# Patient Record
Sex: FEMALE | Race: WHITE | Employment: OTHER
[De-identification: names, ages, dates, MRNs, and addresses within clinical notes are randomized per-mention and may not be internally consistent; named-entity substitution may affect disease eponyms.]

---

## 2017-05-26 ENCOUNTER — HEALTH MAINTENANCE LETTER (OUTPATIENT)
Age: 32
End: 2017-05-26

## 2019-09-05 ENCOUNTER — APPOINTMENT (OUTPATIENT)
Dept: CT IMAGING | Facility: CLINIC | Age: 34
End: 2019-09-05
Attending: EMERGENCY MEDICINE

## 2019-09-05 ENCOUNTER — HOSPITAL ENCOUNTER (EMERGENCY)
Facility: CLINIC | Age: 34
Discharge: HOME | End: 2019-09-05
Attending: EMERGENCY MEDICINE | Admitting: EMERGENCY MEDICINE

## 2019-09-05 VITALS
BODY MASS INDEX: 22.88 KG/M2 | TEMPERATURE: 98.3 F | SYSTOLIC BLOOD PRESSURE: 107 MMHG | HEART RATE: 72 BPM | HEIGHT: 64 IN | DIASTOLIC BLOOD PRESSURE: 61 MMHG | WEIGHT: 134 LBS | RESPIRATION RATE: 18 BRPM | OXYGEN SATURATION: 98 %

## 2019-09-05 DIAGNOSIS — R07.9 CHEST PAIN, UNSPECIFIED TYPE: ICD-10-CM

## 2019-09-05 LAB
ALBUMIN SERPL-MCNC: 4.6 G/DL (ref 3.4–5)
ALP SERPL-CCNC: 47 U/L (ref 40–150)
ALT SERPL W P-5'-P-CCNC: 19 U/L (ref 0–50)
ANION GAP SERPL CALCULATED.3IONS-SCNC: 5 MMOL/L (ref 3–14)
AST SERPL W P-5'-P-CCNC: 14 U/L (ref 0–45)
BASOPHILS # BLD AUTO: 0 10E9/L (ref 0–0.2)
BASOPHILS NFR BLD AUTO: 0.2 %
BILIRUB SERPL-MCNC: 0.5 MG/DL (ref 0.2–1.3)
BUN SERPL-MCNC: 14 MG/DL (ref 7–30)
CALCIUM SERPL-MCNC: 9.4 MG/DL (ref 8.5–10.1)
CHLORIDE SERPL-SCNC: 105 MMOL/L (ref 94–109)
CO2 SERPL-SCNC: 28 MMOL/L (ref 20–32)
CREAT SERPL-MCNC: 0.79 MG/DL (ref 0.52–1.04)
D DIMER PPP FEU-MCNC: 0.7 UG/ML FEU (ref 0–0.5)
DIFFERENTIAL METHOD BLD: NORMAL
EOSINOPHIL # BLD AUTO: 0.2 10E9/L (ref 0–0.7)
EOSINOPHIL NFR BLD AUTO: 2.2 %
ERYTHROCYTE [DISTWIDTH] IN BLOOD BY AUTOMATED COUNT: 12.5 % (ref 10–15)
GFR SERPL CREATININE-BSD FRML MDRD: >90 ML/MIN/{1.73_M2}
GLUCOSE SERPL-MCNC: 91 MG/DL (ref 70–99)
HCT VFR BLD AUTO: 38.2 % (ref 35–47)
HGB BLD-MCNC: 13 G/DL (ref 11.7–15.7)
IMM GRANULOCYTES # BLD: 0 10E9/L (ref 0–0.4)
IMM GRANULOCYTES NFR BLD: 0.1 %
INTERPRETATION ECG - MUSE: NORMAL
LIPASE SERPL-CCNC: 99 U/L (ref 73–393)
LYMPHOCYTES # BLD AUTO: 2.3 10E9/L (ref 0.8–5.3)
LYMPHOCYTES NFR BLD AUTO: 27.1 %
MCH RBC QN AUTO: 32 PG (ref 26.5–33)
MCHC RBC AUTO-ENTMCNC: 34 G/DL (ref 31.5–36.5)
MCV RBC AUTO: 94 FL (ref 78–100)
MONOCYTES # BLD AUTO: 0.5 10E9/L (ref 0–1.3)
MONOCYTES NFR BLD AUTO: 5.3 %
NEUTROPHILS # BLD AUTO: 5.5 10E9/L (ref 1.6–8.3)
NEUTROPHILS NFR BLD AUTO: 65.1 %
NRBC # BLD AUTO: 0 10*3/UL
NRBC BLD AUTO-RTO: 0 /100
PLATELET # BLD AUTO: 299 10E9/L (ref 150–450)
POTASSIUM SERPL-SCNC: 3.4 MMOL/L (ref 3.4–5.3)
PROT SERPL-MCNC: 8.2 G/DL (ref 6.8–8.8)
RBC # BLD AUTO: 4.06 10E12/L (ref 3.8–5.2)
SODIUM SERPL-SCNC: 138 MMOL/L (ref 133–144)
TROPONIN I SERPL-MCNC: <0.015 UG/L (ref 0–0.04)
TROPONIN I SERPL-MCNC: <0.015 UG/L (ref 0–0.04)
WBC # BLD AUTO: 8.5 10E9/L (ref 4–11)

## 2019-09-05 PROCEDURE — 85379 FIBRIN DEGRADATION QUANT: CPT | Performed by: EMERGENCY MEDICINE

## 2019-09-05 PROCEDURE — 99285 EMERGENCY DEPT VISIT HI MDM: CPT | Mod: 25

## 2019-09-05 PROCEDURE — 93005 ELECTROCARDIOGRAM TRACING: CPT

## 2019-09-05 PROCEDURE — 85025 COMPLETE CBC W/AUTO DIFF WBC: CPT | Performed by: EMERGENCY MEDICINE

## 2019-09-05 PROCEDURE — 83690 ASSAY OF LIPASE: CPT | Performed by: EMERGENCY MEDICINE

## 2019-09-05 PROCEDURE — 71275 CT ANGIOGRAPHY CHEST: CPT

## 2019-09-05 PROCEDURE — 84484 ASSAY OF TROPONIN QUANT: CPT | Performed by: EMERGENCY MEDICINE

## 2019-09-05 PROCEDURE — 80053 COMPREHEN METABOLIC PANEL: CPT | Performed by: EMERGENCY MEDICINE

## 2019-09-05 PROCEDURE — 25000125 ZZHC RX 250: Performed by: EMERGENCY MEDICINE

## 2019-09-05 PROCEDURE — 25000128 H RX IP 250 OP 636: Performed by: EMERGENCY MEDICINE

## 2019-09-05 PROCEDURE — 93005 ELECTROCARDIOGRAM TRACING: CPT | Mod: 76

## 2019-09-05 RX ORDER — IOPAMIDOL 755 MG/ML
57 INJECTION, SOLUTION INTRAVASCULAR ONCE
Status: COMPLETED | OUTPATIENT
Start: 2019-09-05 | End: 2019-09-05

## 2019-09-05 RX ADMIN — SODIUM CHLORIDE 84 ML: 9 INJECTION, SOLUTION INTRAVENOUS at 17:34

## 2019-09-05 RX ADMIN — IOPAMIDOL 57 ML: 755 INJECTION, SOLUTION INTRAVENOUS at 17:34

## 2019-09-05 ASSESSMENT — MIFFLIN-ST. JEOR: SCORE: 1307.82

## 2019-09-05 ASSESSMENT — ENCOUNTER SYMPTOMS
ACTIVITY CHANGE: 0
SHORTNESS OF BREATH: 0
PALPITATIONS: 0
LIGHT-HEADEDNESS: 1
DIZZINESS: 1
NAUSEA: 1

## 2019-09-05 NOTE — ED AVS SNAPSHOT
Emergency Department  [address] [place] [id number]  Phone:  [phone]  Fax:  [phone]                                    Hemalatha Turner   MRN: [id number]    Department:   Emergency Department   Date of Visit:  9/5/2019           After Visit Summary Signature Page    I have received my discharge instructions, and my questions have been answered. I have discussed any challenges I see with this plan with the nurse or doctor.    ..........................................................................................................................................  Patient/Patient Representative Signature      ..........................................................................................................................................  Patient Representative Print Name and Relationship to Patient    ..................................................               ................................................  Date                                   Time    ..........................................................................................................................................  Reviewed by Signature/Title    ...................................................              ..............................................  Date                                               Time          22EPIC Rev 08/18

## 2019-09-05 NOTE — ED PROVIDER NOTES
History     Chief Complaint:  Chest Pain    HPI   Hemalatha Turner is a 33 year old female who presents with chest pain. The patient states that around 1215 she started to experience central chest pain as well as dizziness, lightheadedness as well as some nausea. She mentions that this occurred after receiving a call from her OBGYN with poor results about her ultrasound after a miscarriage in August. The patient states that she laid down which seemed to help relieve her symptoms. The patient denies any shortness of breath, palpitations or change in activity. She does state that this occurred after eating lunch, which she states was normal. The patient currently rates her pain at a 1. She does note that she had a migraine 2 days ago.     Cardiac/PE/DVT Risk Factors:  History of hypertension - no  History of hyperlipidemia - no  History of diabetes - no  History of smoking - no  Personal history of PE/DVT - no  Family history of PE/DVT - no  Family history of heart complications - yes, father WPW   Recent travel - no  Recent surgery - no  Other immobilizations - no  Cancer - no  Birth control- no     Allergies:  No Known Drug Allergies    Medications:    The patient is not currently taking any prescribed medications.    Past Medical History:    palpitations  Scoliosis    Past Surgical History:    Spine surgery     Family History:    WPW- father    Social History:  The patient was accompanied to the ED by .  Smoking Status: Never Smoker  Smokeless Tobacco: Never Used  Alcohol Use: Positive  Marital Status:        Review of Systems   Constitutional: Negative for activity change.   Respiratory: Negative for shortness of breath.    Cardiovascular: Positive for chest pain. Negative for palpitations.   Gastrointestinal: Positive for nausea.   Neurological: Positive for dizziness and light-headedness.   All other systems reviewed and are negative.        Physical Exam     Patient Vitals for the past 24  "hrs:   BP Temp Temp src Pulse Heart Rate Resp SpO2 Height Weight   09/05/19 1900 107/61 -- -- 72 75 18 98 % -- --   09/05/19 1800 101/63 -- -- 69 67 13 100 % -- --   09/05/19 1700 108/66 -- -- 65 65 20 97 % -- --   09/05/19 1600 -- -- -- -- 83 10 -- -- --   09/05/19 1513 134/72 98.3  F (36.8  C) Oral 85 -- 16 100 % 1.626 m (5' 4\") 60.8 kg (134 lb)       Physical Exam  General: Patient is alert and normal appearing.  HEENT: Head atraumatic    Eyes: pupils equal and reactive. Conjunctiva clear   Nares: patent   Oropharynx: no lesions, uvula midline, no palatal draping, normal voice, no trismus  Neck: Supple without lymphadenopathy, no meningismus  Chest: Heart regular rate and rhythm.   Lungs: Equal clear to auscultation with no wheeze or rales  Abdomen: Soft, non tender, nondistended, normal bowel sounds  Back: No costovertebral angle tenderness, no midline C, T or L spine tenderness  Neuro: Grossly nonfocal, normal speech, strength equal bilaterally, CN 2-12 intact  Extremities: No deformities, equal radial and DP pulses. No clubbing, cyanosis.  No edema  Skin: Warm and dry with no rash.       Emergency Department Course     ECG:  ECG taken at 1521, ECG read at 1530  Normal sinus rhythm with Sinus arrhythmia  Rightward axis  boderline ECG  Rate 79 bpm. NJ interval 118 ms. QRS duration 82 ms. QT/QTc 392/449 ms. P-R-T axes 79 92 67.    ECG:  ECG taken at 1820, ECG read at 1810  Sinus Rhythm with short NJ  Otherwise normal ECG  Rate 66 bpm. NJ interval 106 ms. QRS duration 84 ms. QT/QTc 426/446 ms. P-R-T axes 48 83 48.    Imaging:  Radiology findings were communicated with the patient who voiced understanding of the findings.    CT chest, pulmonary embolism:  1. No evidence of pulmonary embolism. Thoracic aorta is unremarkable.  2. Lungs are clear. No infiltrate, consolidation or mass.    Reading per radiology    Laboratory:  Laboratory findings were communicated with the patient who voiced understanding of the " findings.    Troponin 1838: <0.015    Troponin 1538: <0.015    CBC: WBC 8.5, HGB 13.0,   CMP: WNL (Creatinine 0.79)    Lipase: 99    D dimer: 0.7 (H)     Emergency Department Course:     Nursing notes and vitals reviewed.    1530 I performed an exam of the patient as documented above.     1538 IV was inserted and blood was drawn for laboratory testing, results above.    1622 I returned to check on patient.     1740 The patient was sent for a CT chest pulmonary embolism  while in the emergency department, results above.     1838 I returned to check on patient.     1838 IV was inserted and blood was drawn for laboratory testing, results above.    1918 I personally reviewed the laboratory and imaging results with the patient and answered all related questions prior to discharge.    Impression & Plan      Medical Decision Making:  Hemalatha Turner is a 33 year old female who presents to the emergency department today for evaluation of chest pain, lightheadedness, dizziness and nausea.  Patient states symptoms have mostly resolved at the time of arrival.  EKG showed no acute ischemic changes and troponin was negative.  Delta troponin was obtained and revealed no change and was still negative.  Patient's d-dimer was mildly elevated she was sent for CT scan which was thankfully negative for PE or dissection.  No evidence of pneumothorax or pneumonia.  Patient is hemodynamically stable.  She had a recurrence of her pain and symptoms while here in the ER and repeat EKG demonstrated no change.  On reviewing her results with the patient she did discuss that she is been under significant stress and had received a phone call from her OB/GYN today stating that she has a possible uterine septum and she is had 2 recent miscarriages.  Patient is concerned that her psychological stress might have caused her symptoms today.  We did discuss that is a possibility in addition other causes such as GERD or chest wall pain may be  causing her symptoms.  Recommend follow-up with her primary care physician.  Patient's family has a history of WPW and she does have a short FL.  She does have a couple episodes of sinus tachycardia going to the 100s but no sustained symptoms.  Patient's EKGs show normal sinus rhythm.  Recommend to follow-up with this with her primary care provider.  All questions and concerns addressed and is with reasonable clinical certainty that I feel the patient is safe for discharge at this time.    Diagnosis:      ICD-10-CM    1. Chest pain, unspecified type R07.9      Disposition:   The patient is discharged to home.    Scribe Disclosure:  I, Patience Mena, am serving as a scribe at 4:54 PM on 9/5/2019 to document services personally performed by Alejandra Ruiz MD based on my observations and the provider's statements to me.   EMERGENCY DEPARTMENT       Alejandra Ruiz MD  09/05/19 1959

## 2019-10-07 ENCOUNTER — HOSPITAL PATHOLOGY (OUTPATIENT)
Dept: OTHER | Facility: CLINIC | Age: 34
End: 2019-10-07

## 2019-10-09 LAB — COPATH REPORT: NORMAL

## 2019-11-22 LAB
ABO + RH BLD: NORMAL
ABO + RH BLD: NORMAL
BLD GP AB SCN SERPL QL: NEGATIVE
C TRACH DNA SPEC QL PROBE+SIG AMP: NORMAL
HBV SURFACE AG SERPL QL IA: NEGATIVE
HIV 1+2 AB+HIV1 P24 AG SERPL QL IA: NORMAL
N GONORRHOEA DNA SPEC QL PROBE+SIG AMP: NORMAL
RUBELLA ANTIBODY IGG QUANTITATIVE: NORMAL IU/ML
TREPONEMA ANTIBODIES: NORMAL

## 2020-03-15 ENCOUNTER — HEALTH MAINTENANCE LETTER (OUTPATIENT)
Age: 35
End: 2020-03-15

## 2020-05-14 ENCOUNTER — HOSPITAL ENCOUNTER (OUTPATIENT)
Facility: CLINIC | Age: 35
End: 2020-05-14
Admitting: OBSTETRICS & GYNECOLOGY

## 2020-05-14 ENCOUNTER — HOSPITAL ENCOUNTER (OUTPATIENT)
Facility: CLINIC | Age: 35
Discharge: HOME | End: 2020-05-14
Attending: OBSTETRICS & GYNECOLOGY | Admitting: OBSTETRICS & GYNECOLOGY

## 2020-05-14 VITALS
TEMPERATURE: 98.4 F | DIASTOLIC BLOOD PRESSURE: 68 MMHG | HEART RATE: 108 BPM | SYSTOLIC BLOOD PRESSURE: 124 MMHG | RESPIRATION RATE: 14 BRPM | OXYGEN SATURATION: 100 %

## 2020-05-14 DIAGNOSIS — B96.89 BACTERIAL VAGINOSIS: Primary | ICD-10-CM

## 2020-05-14 DIAGNOSIS — N76.0 BACTERIAL VAGINOSIS: Primary | ICD-10-CM

## 2020-05-14 PROBLEM — R10.2 VAGINAL PAIN: Status: ACTIVE | Noted: 2020-05-14

## 2020-05-14 LAB
ALBUMIN UR-MCNC: NEGATIVE MG/DL
APPEARANCE UR: CLEAR
BILIRUB UR QL STRIP: NEGATIVE
COLOR UR AUTO: YELLOW
GLUCOSE UR STRIP-MCNC: NEGATIVE MG/DL
HGB UR QL STRIP: NEGATIVE
KETONES UR STRIP-MCNC: NEGATIVE MG/DL
LEUKOCYTE ESTERASE UR QL STRIP: NEGATIVE
NITRATE UR QL: NEGATIVE
PH UR STRIP: 6.5 PH (ref 5–7)
RBC #/AREA URNS AUTO: 2 /HPF (ref 0–2)
SOURCE: ABNORMAL
SP GR UR STRIP: 1.01 (ref 1–1.03)
SPECIMEN SOURCE: ABNORMAL
SQUAMOUS #/AREA URNS AUTO: 2 /HPF (ref 0–1)
UROBILINOGEN UR STRIP-MCNC: NORMAL MG/DL (ref 0–2)
WBC #/AREA URNS AUTO: <1 /HPF (ref 0–5)
WET PREP SPEC: ABNORMAL

## 2020-05-14 PROCEDURE — 87086 URINE CULTURE/COLONY COUNT: CPT | Performed by: OBSTETRICS & GYNECOLOGY

## 2020-05-14 PROCEDURE — 81001 URINALYSIS AUTO W/SCOPE: CPT | Performed by: OBSTETRICS & GYNECOLOGY

## 2020-05-14 PROCEDURE — 87210 SMEAR WET MOUNT SALINE/INK: CPT | Performed by: OBSTETRICS & GYNECOLOGY

## 2020-05-14 PROCEDURE — 59025 FETAL NON-STRESS TEST: CPT

## 2020-05-14 PROCEDURE — G0463 HOSPITAL OUTPT CLINIC VISIT: HCPCS | Mod: 25

## 2020-05-14 RX ORDER — VITAMIN A ACETATE, .BETA.-CAROTENE, ASCORBIC ACID, CHOLECALCIFEROL, .ALPHA.-TOCOPHEROL ACETATE, DL-, THIAMINE MONONITRATE, RIBOFLAVIN, NIACINAMIDE, PYRIDOXINE HYDROCHLORIDE, FOLIC ACID, CYANOCOBALAMIN, CALCIUM CARBONATE, FERROUS FUMARATE, ZINC OXIDE, AND CUPRIC OXIDE 2000; 2000; 120; 400; 22; 1.84; 3; 20; 10; 1; 12; 200; 27; 25; 2 [IU]/1; [IU]/1; MG/1; [IU]/1; MG/1; MG/1; MG/1; MG/1; MG/1; MG/1; UG/1; MG/1; MG/1; MG/1; MG/1
1 TABLET ORAL DAILY
COMMUNITY

## 2020-05-14 RX ORDER — CHLORAL HYDRATE 500 MG
2 CAPSULE ORAL DAILY
COMMUNITY

## 2020-05-14 RX ORDER — FAMOTIDINE 20 MG
1 TABLET ORAL DAILY
COMMUNITY

## 2020-05-14 RX ORDER — METRONIDAZOLE 7.5 MG/G
1 GEL VAGINAL DAILY
Qty: 5 G | Refills: 0 | Status: ON HOLD | OUTPATIENT
Start: 2020-05-14 | End: 2020-08-30

## 2020-05-15 LAB
BACTERIA SPEC CULT: NORMAL
Lab: NORMAL
SPECIMEN SOURCE: NORMAL

## 2020-05-15 NOTE — PLAN OF CARE
Sterile spec performs for wet prep testing.  Without light source, cervix visualized minimally.  Appears thick and closed for what is able to be seen.  Pt denies contractions.

## 2020-05-15 NOTE — PLAN OF CARE
Discharge instructions given, patient verbalizes understanding.  Pt to  prescription at [facility]; verbalizes understanding.  Pt ambulatory, discharged to home in stable condition.

## 2020-05-15 NOTE — DISCHARGE INSTRUCTIONS
Discharge Instruction for Undelivered Patients      You were seen for: pain  We Consulted: Dr. MAURY Tang  You had (Test or Medicine): wet prep, UA     Diet:   Drink 8 to 12 glasses of liquids (milk, juice, water) every day.  You may eat meals and snacks.     Activity:  Call your doctor or nurse midwife if your baby is moving less than usual.     Call your provider if you notice:  Swelling in your face or increased swelling in your hands or legs.  Headaches that are not relieved by Tylenol (acetaminophen).  Changes in your vision (blurring: seeing spots or stars.)  Nausea (sick to your stomach) and vomiting (throwing up).   Weight gain of 5 pounds or more per week.  Heartburn that doesn't go away.  Signs of bladder infection: pain when you urinate (use the toilet), need to go more often and more urgently.  The bag of saini (rupture of membranes) breaks, or you notice leaking in your underwear.  Bright red blood in your underwear.  Abdominal (lower belly) or stomach pain.  For first baby: Contractions (tightening) less than 5 minutes apart for one hour or more.  Second (plus) baby: Contractions (tightening) less than 10 minutes apart and getting stronger.  *If less than 34 weeks: Contractions (tightenings) more than 6 times in one hour.  Increase or change in vaginal discharge (note the color and amount)  Other:   your prescription at the [facility]     Follow-up:  As scheduled in the clinic

## 2020-05-15 NOTE — PROGRESS NOTES
Pt presents to [facility] with c/o vaginal pain rated as 2-3/10 that is continuous. Pt states that this is her first time experiencing this pain and that she has had losses before so she was nervous. Validated patients concerns and thanked her for coming in to be seen today. Pt states that she has had some high blood pressures in the clinic that she relates to being anxious. Otherwise patient states that she has not had any complications this pregnancy. Pt states that she does not know of any fetal abnormalities. Declines vaginal discharge/bleeding. Notes FM. FHR tracing 140, moderate variability, accelerations present, occasional variables. Report given to ANKITA Mojica who assumed care of patient at this point in time. Relinquished care of patient.

## 2020-08-07 LAB — GROUP B STREP PCR: NORMAL

## 2020-08-30 ENCOUNTER — HOSPITAL ENCOUNTER (INPATIENT)
Facility: CLINIC | Age: 35
LOS: 1 days | Discharge: HOME | End: 2020-09-01
Attending: OBSTETRICS & GYNECOLOGY | Admitting: OBSTETRICS & GYNECOLOGY
Payer: OTHER GOVERNMENT

## 2020-08-30 RX ORDER — ONDANSETRON 2 MG/ML
4 INJECTION INTRAMUSCULAR; INTRAVENOUS EVERY 6 HOURS PRN
Status: DISCONTINUED | OUTPATIENT
Start: 2020-08-30 | End: 2020-08-31

## 2020-08-31 ENCOUNTER — ANESTHESIA (OUTPATIENT)
Dept: OBGYN | Facility: CLINIC | Age: 35
End: 2020-08-31

## 2020-08-31 ENCOUNTER — ANESTHESIA EVENT (OUTPATIENT)
Dept: OBGYN | Facility: CLINIC | Age: 35
End: 2020-08-31

## 2020-08-31 PROBLEM — Z37.9 NORMAL LABOR: Status: ACTIVE | Noted: 2020-08-31

## 2020-08-31 LAB
ABO + RH BLD: NORMAL
ABO + RH BLD: NORMAL
BASOPHILS # BLD AUTO: 0 10E9/L (ref 0–0.2)
BASOPHILS NFR BLD AUTO: 0.1 %
BLD GP AB SCN SERPL QL: NORMAL
BLOOD BANK CMNT PATIENT-IMP: NORMAL
DIFFERENTIAL METHOD BLD: ABNORMAL
EOSINOPHIL # BLD AUTO: 0.1 10E9/L (ref 0–0.7)
EOSINOPHIL NFR BLD AUTO: 0.6 %
ERYTHROCYTE [DISTWIDTH] IN BLOOD BY AUTOMATED COUNT: 13.2 % (ref 10–15)
HCT VFR BLD AUTO: 38.5 % (ref 35–47)
HGB BLD-MCNC: 13 G/DL (ref 11.7–15.7)
IMM GRANULOCYTES # BLD: 0.1 10E9/L (ref 0–0.4)
IMM GRANULOCYTES NFR BLD: 0.4 %
LABORATORY COMMENT REPORT: NORMAL
LYMPHOCYTES # BLD AUTO: 2.6 10E9/L (ref 0.8–5.3)
LYMPHOCYTES NFR BLD AUTO: 18.8 %
MCH RBC QN AUTO: 31.3 PG (ref 26.5–33)
MCHC RBC AUTO-ENTMCNC: 33.8 G/DL (ref 31.5–36.5)
MCV RBC AUTO: 93 FL (ref 78–100)
MONOCYTES # BLD AUTO: 0.9 10E9/L (ref 0–1.3)
MONOCYTES NFR BLD AUTO: 6.7 %
NEUTROPHILS # BLD AUTO: 10.3 10E9/L (ref 1.6–8.3)
NEUTROPHILS NFR BLD AUTO: 73.4 %
NRBC # BLD AUTO: 0 10*3/UL
NRBC BLD AUTO-RTO: 0 /100
PLATELET # BLD AUTO: 306 10E9/L (ref 150–450)
RBC # BLD AUTO: 4.16 10E12/L (ref 3.8–5.2)
SARS-COV-2 RNA SPEC QL NAA+PROBE: NEGATIVE
SARS-COV-2 RNA SPEC QL NAA+PROBE: NORMAL
SPECIMEN EXP DATE BLD: NORMAL
SPECIMEN SOURCE: NORMAL
SPECIMEN SOURCE: NORMAL
T PALLIDUM AB SER QL: NONREACTIVE
WBC # BLD AUTO: 14 10E9/L (ref 4–11)

## 2020-08-31 PROCEDURE — 36415 COLL VENOUS BLD VENIPUNCTURE: CPT | Performed by: OBSTETRICS & GYNECOLOGY

## 2020-08-31 PROCEDURE — 86780 TREPONEMA PALLIDUM: CPT | Performed by: OBSTETRICS & GYNECOLOGY

## 2020-08-31 PROCEDURE — 86900 BLOOD TYPING SEROLOGIC ABO: CPT | Performed by: OBSTETRICS & GYNECOLOGY

## 2020-08-31 PROCEDURE — 25800030 ZZH RX IP 258 OP 636: Performed by: OBSTETRICS & GYNECOLOGY

## 2020-08-31 PROCEDURE — 25000128 H RX IP 250 OP 636: Performed by: ANESTHESIOLOGY

## 2020-08-31 PROCEDURE — 3E0R3BZ INTRODUCTION OF ANESTHETIC AGENT INTO SPINAL CANAL, PERCUTANEOUS APPROACH: ICD-10-PCS | Performed by: ANESTHESIOLOGY

## 2020-08-31 PROCEDURE — 37000011 ZZH ANESTHESIA WARD SERVICE

## 2020-08-31 PROCEDURE — 25000125 ZZHC RX 250: Performed by: ANESTHESIOLOGY

## 2020-08-31 PROCEDURE — 25000132 ZZH RX MED GY IP 250 OP 250 PS 637: Performed by: OBSTETRICS & GYNECOLOGY

## 2020-08-31 PROCEDURE — 40000809 ZZH STATISTIC NO DOCUMENTATION TO SUPPORT CHARGE

## 2020-08-31 PROCEDURE — 86901 BLOOD TYPING SEROLOGIC RH(D): CPT | Performed by: OBSTETRICS & GYNECOLOGY

## 2020-08-31 PROCEDURE — U0003 INFECTIOUS AGENT DETECTION BY NUCLEIC ACID (DNA OR RNA); SEVERE ACUTE RESPIRATORY SYNDROME CORONAVIRUS 2 (SARS-COV-2) (CORONAVIRUS DISEASE [COVID-19]), AMPLIFIED PROBE TECHNIQUE, MAKING USE OF HIGH THROUGHPUT TECHNOLOGIES AS DESCRIBED BY CMS-2020-01-R: HCPCS | Performed by: OBSTETRICS & GYNECOLOGY

## 2020-08-31 PROCEDURE — 72200001 ZZH LABOR CARE VAGINAL DELIVERY SINGLE

## 2020-08-31 PROCEDURE — 00HU33Z INSERTION OF INFUSION DEVICE INTO SPINAL CANAL, PERCUTANEOUS APPROACH: ICD-10-PCS | Performed by: ANESTHESIOLOGY

## 2020-08-31 PROCEDURE — 0HQ9XZZ REPAIR PERINEUM SKIN, EXTERNAL APPROACH: ICD-10-PCS | Performed by: OBSTETRICS & GYNECOLOGY

## 2020-08-31 PROCEDURE — 12000035 ZZH R&B POSTPARTUM

## 2020-08-31 PROCEDURE — 85025 COMPLETE CBC W/AUTO DIFF WBC: CPT | Performed by: OBSTETRICS & GYNECOLOGY

## 2020-08-31 PROCEDURE — 86850 RBC ANTIBODY SCREEN: CPT | Performed by: OBSTETRICS & GYNECOLOGY

## 2020-08-31 PROCEDURE — G0463 HOSPITAL OUTPT CLINIC VISIT: HCPCS | Mod: 25

## 2020-08-31 PROCEDURE — 59025 FETAL NON-STRESS TEST: CPT

## 2020-08-31 PROCEDURE — 25000125 ZZHC RX 250: Performed by: OBSTETRICS & GYNECOLOGY

## 2020-08-31 RX ORDER — CARBOPROST TROMETHAMINE 250 UG/ML
250 INJECTION, SOLUTION INTRAMUSCULAR
Status: DISCONTINUED | OUTPATIENT
Start: 2020-08-30 | End: 2020-08-31

## 2020-08-31 RX ORDER — ACETAMINOPHEN 325 MG/1
650 TABLET ORAL EVERY 4 HOURS PRN
Status: DISCONTINUED | OUTPATIENT
Start: 2020-08-31 | End: 2020-09-01 | Stop reason: HOSPADM

## 2020-08-31 RX ORDER — NALOXONE HYDROCHLORIDE 0.4 MG/ML
.1-.4 INJECTION, SOLUTION INTRAMUSCULAR; INTRAVENOUS; SUBCUTANEOUS
Status: DISCONTINUED | OUTPATIENT
Start: 2020-08-31 | End: 2020-09-01 | Stop reason: HOSPADM

## 2020-08-31 RX ORDER — SODIUM CHLORIDE, SODIUM LACTATE, POTASSIUM CHLORIDE, CALCIUM CHLORIDE 600; 310; 30; 20 MG/100ML; MG/100ML; MG/100ML; MG/100ML
INJECTION, SOLUTION INTRAVENOUS CONTINUOUS
Status: DISCONTINUED | OUTPATIENT
Start: 2020-08-31 | End: 2020-08-31

## 2020-08-31 RX ORDER — HYDROCORTISONE 2.5 %
CREAM (GRAM) TOPICAL 3 TIMES DAILY PRN
Status: DISCONTINUED | OUTPATIENT
Start: 2020-08-31 | End: 2020-09-01 | Stop reason: HOSPADM

## 2020-08-31 RX ORDER — FENTANYL CITRATE 50 UG/ML
100 INJECTION, SOLUTION INTRAMUSCULAR; INTRAVENOUS ONCE
Status: COMPLETED | OUTPATIENT
Start: 2020-08-31 | End: 2020-08-31

## 2020-08-31 RX ORDER — NALOXONE HYDROCHLORIDE 0.4 MG/ML
.1-.4 INJECTION, SOLUTION INTRAMUSCULAR; INTRAVENOUS; SUBCUTANEOUS
Status: DISCONTINUED | OUTPATIENT
Start: 2020-08-30 | End: 2020-08-31

## 2020-08-31 RX ORDER — ROPIVACAINE HYDROCHLORIDE 2 MG/ML
10 INJECTION, SOLUTION EPIDURAL; INFILTRATION; PERINEURAL ONCE
Status: COMPLETED | OUTPATIENT
Start: 2020-08-31 | End: 2020-08-31

## 2020-08-31 RX ORDER — ACETAMINOPHEN 325 MG/1
650 TABLET ORAL EVERY 4 HOURS PRN
Status: DISCONTINUED | OUTPATIENT
Start: 2020-08-30 | End: 2020-08-31

## 2020-08-31 RX ORDER — TRANEXAMIC ACID 10 MG/ML
1 INJECTION, SOLUTION INTRAVENOUS EVERY 30 MIN PRN
Status: DISCONTINUED | OUTPATIENT
Start: 2020-08-31 | End: 2020-09-01 | Stop reason: HOSPADM

## 2020-08-31 RX ORDER — LIDOCAINE HYDROCHLORIDE AND EPINEPHRINE 15; 5 MG/ML; UG/ML
INJECTION, SOLUTION EPIDURAL PRN
OUTPATIENT
Start: 2020-08-31

## 2020-08-31 RX ORDER — FENTANYL CITRATE 50 UG/ML
50-100 INJECTION, SOLUTION INTRAMUSCULAR; INTRAVENOUS
Status: DISCONTINUED | OUTPATIENT
Start: 2020-08-31 | End: 2020-08-31

## 2020-08-31 RX ORDER — OXYTOCIN 10 [USP'U]/ML
10 INJECTION, SOLUTION INTRAMUSCULAR; INTRAVENOUS
Status: DISCONTINUED | OUTPATIENT
Start: 2020-08-31 | End: 2020-09-01 | Stop reason: HOSPADM

## 2020-08-31 RX ORDER — OXYTOCIN/0.9 % SODIUM CHLORIDE 30/500 ML
340 PLASTIC BAG, INJECTION (ML) INTRAVENOUS CONTINUOUS PRN
Status: DISCONTINUED | OUTPATIENT
Start: 2020-08-31 | End: 2020-09-01 | Stop reason: HOSPADM

## 2020-08-31 RX ORDER — AMOXICILLIN 250 MG
2 CAPSULE ORAL 2 TIMES DAILY
Status: DISCONTINUED | OUTPATIENT
Start: 2020-08-31 | End: 2020-09-01 | Stop reason: HOSPADM

## 2020-08-31 RX ORDER — BISACODYL 10 MG
10 SUPPOSITORY, RECTAL RECTAL DAILY PRN
Status: DISCONTINUED | OUTPATIENT
Start: 2020-09-02 | End: 2020-09-01 | Stop reason: HOSPADM

## 2020-08-31 RX ORDER — TRANEXAMIC ACID 10 MG/ML
1 INJECTION, SOLUTION INTRAVENOUS EVERY 30 MIN PRN
Status: DISCONTINUED | OUTPATIENT
Start: 2020-08-30 | End: 2020-08-31

## 2020-08-31 RX ORDER — EPHEDRINE SULFATE 50 MG/ML
5 INJECTION, SOLUTION INTRAMUSCULAR; INTRAVENOUS; SUBCUTANEOUS
Status: DISCONTINUED | OUTPATIENT
Start: 2020-08-31 | End: 2020-09-01 | Stop reason: HOSPADM

## 2020-08-31 RX ORDER — IBUPROFEN 400 MG/1
800 TABLET, FILM COATED ORAL EVERY 6 HOURS PRN
Status: DISCONTINUED | OUTPATIENT
Start: 2020-08-31 | End: 2020-09-01 | Stop reason: HOSPADM

## 2020-08-31 RX ORDER — IBUPROFEN 400 MG/1
800 TABLET, FILM COATED ORAL
Status: DISCONTINUED | OUTPATIENT
Start: 2020-08-30 | End: 2020-08-31

## 2020-08-31 RX ORDER — ONDANSETRON 4 MG/1
4 TABLET, ORALLY DISINTEGRATING ORAL EVERY 6 HOURS PRN
Status: DISCONTINUED | OUTPATIENT
Start: 2020-08-31 | End: 2020-09-01 | Stop reason: HOSPADM

## 2020-08-31 RX ORDER — ONDANSETRON 2 MG/ML
4 INJECTION INTRAMUSCULAR; INTRAVENOUS EVERY 6 HOURS PRN
Status: DISCONTINUED | OUTPATIENT
Start: 2020-08-31 | End: 2020-09-01 | Stop reason: HOSPADM

## 2020-08-31 RX ORDER — OXYCODONE AND ACETAMINOPHEN 5; 325 MG/1; MG/1
1 TABLET ORAL
Status: DISCONTINUED | OUTPATIENT
Start: 2020-08-30 | End: 2020-08-31

## 2020-08-31 RX ORDER — AMOXICILLIN 250 MG
1 CAPSULE ORAL 2 TIMES DAILY
Status: DISCONTINUED | OUTPATIENT
Start: 2020-08-31 | End: 2020-09-01 | Stop reason: HOSPADM

## 2020-08-31 RX ORDER — METHYLERGONOVINE MALEATE 0.2 MG/ML
200 INJECTION INTRAVENOUS
Status: DISCONTINUED | OUTPATIENT
Start: 2020-08-30 | End: 2020-08-31

## 2020-08-31 RX ORDER — ONDANSETRON 2 MG/ML
4 INJECTION INTRAMUSCULAR; INTRAVENOUS EVERY 6 HOURS PRN
Status: DISCONTINUED | OUTPATIENT
Start: 2020-08-30 | End: 2020-08-31

## 2020-08-31 RX ORDER — OXYTOCIN/0.9 % SODIUM CHLORIDE 30/500 ML
100 PLASTIC BAG, INJECTION (ML) INTRAVENOUS CONTINUOUS
Status: DISCONTINUED | OUTPATIENT
Start: 2020-08-31 | End: 2020-09-01 | Stop reason: HOSPADM

## 2020-08-31 RX ORDER — MODIFIED LANOLIN
OINTMENT (GRAM) TOPICAL
Status: DISCONTINUED | OUTPATIENT
Start: 2020-08-31 | End: 2020-09-01 | Stop reason: HOSPADM

## 2020-08-31 RX ORDER — NALBUPHINE HYDROCHLORIDE 10 MG/ML
2.5-5 INJECTION, SOLUTION INTRAMUSCULAR; INTRAVENOUS; SUBCUTANEOUS EVERY 6 HOURS PRN
Status: DISCONTINUED | OUTPATIENT
Start: 2020-08-31 | End: 2020-09-01 | Stop reason: HOSPADM

## 2020-08-31 RX ORDER — OXYTOCIN/0.9 % SODIUM CHLORIDE 30/500 ML
100-340 PLASTIC BAG, INJECTION (ML) INTRAVENOUS CONTINUOUS PRN
Status: COMPLETED | OUTPATIENT
Start: 2020-08-30 | End: 2020-08-31

## 2020-08-31 RX ORDER — OXYTOCIN 10 [USP'U]/ML
10 INJECTION, SOLUTION INTRAMUSCULAR; INTRAVENOUS
Status: DISCONTINUED | OUTPATIENT
Start: 2020-08-30 | End: 2020-08-31

## 2020-08-31 RX ADMIN — DOCUSATE SODIUM 50 MG AND SENNOSIDES 8.6 MG 1 TABLET: 8.6; 5 TABLET, FILM COATED ORAL at 21:07

## 2020-08-31 RX ADMIN — Medication 12 ML/HR: at 02:10

## 2020-08-31 RX ADMIN — DOCUSATE SODIUM 50 MG AND SENNOSIDES 8.6 MG 1 TABLET: 8.6; 5 TABLET, FILM COATED ORAL at 10:18

## 2020-08-31 RX ADMIN — IBUPROFEN 800 MG: 400 TABLET ORAL at 05:57

## 2020-08-31 RX ADMIN — IBUPROFEN 800 MG: 400 TABLET ORAL at 12:03

## 2020-08-31 RX ADMIN — IBUPROFEN 800 MG: 400 TABLET ORAL at 17:55

## 2020-08-31 RX ADMIN — SODIUM CHLORIDE, POTASSIUM CHLORIDE, SODIUM LACTATE AND CALCIUM CHLORIDE 1000 ML: 600; 310; 30; 20 INJECTION, SOLUTION INTRAVENOUS at 01:22

## 2020-08-31 RX ADMIN — ACETAMINOPHEN 650 MG: 325 TABLET, FILM COATED ORAL at 05:57

## 2020-08-31 RX ADMIN — ACETAMINOPHEN 650 MG: 325 TABLET, FILM COATED ORAL at 12:02

## 2020-08-31 RX ADMIN — Medication 340 ML/HR: at 04:54

## 2020-08-31 RX ADMIN — Medication 100 ML/HR: at 05:57

## 2020-08-31 RX ADMIN — LIDOCAINE HYDROCHLORIDE AND EPINEPHRINE 3 ML: 15; 5 INJECTION, SOLUTION EPIDURAL at 02:00

## 2020-08-31 RX ADMIN — ROPIVACAINE HYDROCHLORIDE 8 ML: 2 INJECTION, SOLUTION EPIDURAL; INFILTRATION at 02:02

## 2020-08-31 RX ADMIN — SODIUM CHLORIDE, POTASSIUM CHLORIDE, SODIUM LACTATE AND CALCIUM CHLORIDE: 600; 310; 30; 20 INJECTION, SOLUTION INTRAVENOUS at 02:06

## 2020-08-31 RX ADMIN — FENTANYL CITRATE 100 MCG: 50 INJECTION, SOLUTION INTRAMUSCULAR; INTRAVENOUS at 02:05

## 2020-08-31 RX ADMIN — ACETAMINOPHEN 650 MG: 325 TABLET, FILM COATED ORAL at 17:55

## 2020-08-31 NOTE — PLAN OF CARE
Data: Hemalatha Turner transferred to [facility] via wheelchair at 0710. Baby transferred via parent's arms.  Action: Receiving unit notified of transfer: Yes. Patient and family notified of room change. Report given to Kaylee GAGNON at 0720. Belongings sent to receiving unit. Accompanied by Registered Nurse. Oriented patient to surroundings. Call light within reach. ID bands double-checked with receiving RN.  Response: Patient tolerated transfer and is stable.

## 2020-08-31 NOTE — PROVIDER NOTIFICATION
08/31/20 0321   Provider Notification   Provider Name/Title Dr. Roe   Method of Notification Phone   Request Attend Delivery   Notification Reason Status Update  (SVE 5-6 cm pt feeling pressure already. MD coming in)

## 2020-08-31 NOTE — PLAN OF CARE
After obtaining verbal consent from patient, nasopharyngeal swab for COVID-19 test performed. Labeled with patient label and hand delivered to laboratory.  Alida Riggs RN on 8/31/2020 at 12:13 AM

## 2020-08-31 NOTE — L&D DELIVERY NOTE
Delivery Date:  2020      The patient presented at term in labor, progressed to full dilatation, spontaneously ruptured membranes.  After a short second stage, she delivered a liveborn female infant over a perineum with 2 small first-degree tears, which were repaired in the usual manner with 3-0 chromic catgut.  The cord was loosely around the neck and released easily.  Baby cried spontaneously.  The placenta delivered immediately after the baby was delivered, so there was no delayed cord clamping.  The quantitative blood loss was 25 mL.  The patient and infant left the delivery suite in good condition.         DANIEL MURRAY MD             D: 2020   T: 2020   MT: KENY      Name:     OG GARCIAS   MRN:      [id number]        Account:        [id number]   :      1985        Delivery Date:  2020               Document: [id number]

## 2020-08-31 NOTE — L&D DELIVERY NOTE
liveborn female infant   Cord loosely x 1  2 small 1st degree perineal tears opposed with 3-0 chromic  QBL 25 c.c.      :

## 2020-08-31 NOTE — PLAN OF CARE
Pt feeling pressure and progressing quickly. Pt was complete at 0430. Rodriguez removed and room prepped for delivery. Md called. Started pushing at 0444. Viable female infant delivered at 0452. Placenta delivered at 0454 and pitocin started.

## 2020-08-31 NOTE — H&P
33 yo  , at 40 2/7   x 2  Hx of pregnancy loss including loss of mono/mono twins at 22 weeks TTT  A+  GBS-  Uneventful pregnancy  Expect vaginal delivery

## 2020-08-31 NOTE — ANESTHESIA PREPROCEDURE EVALUATION
Anesthesia Pre-Procedure Evaluation    Patient: Hemalatha Turner   MRN: [id number] : 1985          Preoperative Diagnosis: * No pre-op diagnosis entered *    * No procedures listed *    Past Medical History:   Diagnosis Date     Palpitations      Scoliosis (and kyphoscoliosis), idiopathic      Past Surgical History:   Procedure Laterality Date     BACK SURGERY      for scoliosis, rods in back       Anesthesia Evaluation       history and physical reviewed .             ROS/MED HX    ENT/Pulmonary:  - neg pulmonary ROS     Neurologic:  - neg neurologic ROS     Cardiovascular:  - neg cardiovascular ROS       METS/Exercise Tolerance:     Hematologic:         Musculoskeletal:         GI/Hepatic:         Renal/Genitourinary:         Endo:         Psychiatric:         Infectious Disease:         Malignancy:         Other:                           SCOLIOSIS S/P BACK SURGERY WITH RODS      Physical Exam  Normal systems: cardiovascular, pulmonary and dental    Airway   Mallampati: II  TM distance: > 3 FB  Neck ROM: full    Dental     Cardiovascular       Pulmonary     Other findings: Patient has instrumentation in back following scoliosis surgery. She had one sided block with her previous epidural.        Lab Results   Component Value Date    WBC 14.0 (H) 2020    HGB 13.0 2020    HCT 38.5 2020     2020     2019    POTASSIUM 3.4 2019    CHLORIDE 105 2019    CO2 28 2019    BUN 14 2019    CR 0.79 2019    GLC 91 2019    JEANNE 9.4 2019    ALBUMIN 4.6 2019    PROTTOTAL 8.2 2019    ALT 19 2019    AST 14 2019    ALKPHOS 47 2019    BILITOTAL 0.5 2019    LIPASE 99 2019       Preop Vitals  BP Readings from Last 3 Encounters:   20 132/80   20 124/68   19 107/61    Pulse Readings from Last 3 Encounters:   20 108   19 72   01/15/13 79      Resp Readings from Last 3 Encounters:  "  08/30/20 14   05/14/20 14   09/05/19 18    SpO2 Readings from Last 3 Encounters:   05/14/20 100%   09/05/19 98%   01/15/13 98%      Temp Readings from Last 1 Encounters:   08/30/20 37.1  C (98.8  F) (Temporal)    Ht Readings from Last 1 Encounters:   09/05/19 1.626 m (5' 4\")      Wt Readings from Last 1 Encounters:   09/05/19 60.8 kg (134 lb)    Estimated body mass index is 23 kg/m  as calculated from the following:    Height as of 9/5/19: 1.626 m (5' 4\").    Weight as of 9/5/19: 60.8 kg (134 lb).       Anesthesia Plan      History & Physical Review      ASA Status:  2 .  OB Epidural Asa: 2            Postoperative Care      Consents  Anesthetic plan, risks, benefits and alternatives discussed with:  Patient..                 Maryann Parra MD  "

## 2020-08-31 NOTE — PLAN OF CARE
Data: Patient presented to [facility] at 2324.   Reason for maternal/fetal assessment per patient is No chief complaint on file.  .  Patient is a . Prenatal record reviewed.      OB History    Para Term  AB Living   6 3 2 1 2 1   SAB TAB Ectopic Multiple Live Births   2 0 0 0 1      # Outcome Date GA Lbr Guido/2nd Weight Sex Delivery Anes PTL Lv   6 Current            5 SAB 19     SAB      4 SAB 19     SAB      3 Term 17           2 Term 09/12/15    M    JESIKA      Birth Comments: Colin   1  14     I.U. FETAL D         Complications: Twin to twin transfusion   . Medical history:   Past Medical History:   Diagnosis Date     Palpitations      Scoliosis (and kyphoscoliosis), idiopathic      Gestational Age 40w2d. VSS. Fetal movement present. Patient denies cramping, backache, vaginal discharge, pelvic pressure, UTI symptoms, GI problems, bloody show, vaginal bleeding, edema, headache, visual disturbances, epigastric or URQ pain, abdominal pain, rupture of membranes. Support persons Kye present.  Action: Verbal consent for EFM. Triage assessment completed. EFM applied.  Fetal assessment: Presumed adequate fetal oxygenation documented (see flow record).   Response: Dr. Roe informed of Pt arrival, labor status, SVE. Plan per provider is Admit. Patient verbalized agreement with plan. Patient transferred to room 219 ambulatory, oriented to room and call light. Report given to ANKITA Hines.

## 2020-08-31 NOTE — PLAN OF CARE
Patient arrived to unit around 0720. Patient and infant safety, security and unit orientation gone over with patient and . Encouraged to call with any questions/concerns. Will continue to monitor.

## 2020-08-31 NOTE — PLAN OF CARE
VSS, fundus firm, scant flow, voiding adequately, ambulating independently. Breastfeeding encouraged at least 8x in 24 hours. Pain well controlled with tylenol and ibuprofen. Will continue to monitor.

## 2020-08-31 NOTE — LACTATION NOTE
Initial visit. Mother states breast feeding is going okay and seems better than her previous two breast feeding experiences, but she is already tender after only 12 hours.  Mother states infant does a good job of opening her mouth but noticed that her nipple looked flattened after one of the feedings.  Reviewed with Mother and Father the importance of making sure infant has a deep latch with feedings versus a shallow latch.  Reviewed what to look for with a shallow latch, such as a pinched nipple, the feeling of pinching versus pulling at the breast, and when infant opens mouth wide to make sure to help push infant into the breast.    Breastfeeding general information reviewed. Encouraged rooming in, skin to skin, feeding on demand 8-12x/day or sooner if baby cues.   No further questions at this time. Will follow as needed.   Mohini Mixon RN, IBCLC     1800 - Called back into to room to help with a feeding.  Mother was holding infant in cradle position with infant latched on to right breast.  Shallow latch noted.  LC helped Mother place her arms in a cross cradle position and explained how to hold infant and provide breast support.  LC assisted infant into having a deep latch with feeding.  LC educated Mother on to help infant achieve a deep latch.  Mother states it stills is tender but does not hurt.  Mother also noted to have 3 blisters on the tip of her right nipple.  LC pointed this out to Mother and again emphasized with making sure infant has a deep latch should help with further trauma to the nipples.  Mother is using organic nipple balm on nipples.  Declines needing hydrogels or sore nipple shells at this time.  Will continue to follow.

## 2020-08-31 NOTE — PLAN OF CARE
Assumed care of pt at 0030. Pt laboring in room. Pt had lots of questions and questions answered. Pt asking for epidural. Bolus started. MDA in room at 0142. Pt comfortable after epidural placement and moser placed. 0255: pt called RN to bedside and stated her water broke and that she felt a pop.

## 2020-09-01 VITALS
TEMPERATURE: 97.8 F | SYSTOLIC BLOOD PRESSURE: 118 MMHG | HEART RATE: 85 BPM | OXYGEN SATURATION: 96 % | DIASTOLIC BLOOD PRESSURE: 74 MMHG | RESPIRATION RATE: 16 BRPM

## 2020-09-01 PROCEDURE — 25000132 ZZH RX MED GY IP 250 OP 250 PS 637: Performed by: OBSTETRICS & GYNECOLOGY

## 2020-09-01 RX ORDER — AMOXICILLIN 250 MG
1 CAPSULE ORAL 2 TIMES DAILY
Qty: 60 TABLET | Refills: 0 | Status: SHIPPED | OUTPATIENT
Start: 2020-09-01

## 2020-09-01 RX ORDER — IBUPROFEN 800 MG/1
800 TABLET, FILM COATED ORAL EVERY 6 HOURS PRN
Qty: 40 TABLET | Refills: 0 | Status: SHIPPED | OUTPATIENT
Start: 2020-09-01

## 2020-09-01 RX ADMIN — DOCUSATE SODIUM 50 MG AND SENNOSIDES 8.6 MG 1 TABLET: 8.6; 5 TABLET, FILM COATED ORAL at 07:49

## 2020-09-01 RX ADMIN — ACETAMINOPHEN 650 MG: 325 TABLET, FILM COATED ORAL at 07:49

## 2020-09-01 RX ADMIN — ACETAMINOPHEN 650 MG: 325 TABLET, FILM COATED ORAL at 01:28

## 2020-09-01 RX ADMIN — IBUPROFEN 800 MG: 400 TABLET ORAL at 07:49

## 2020-09-01 RX ADMIN — IBUPROFEN 800 MG: 400 TABLET ORAL at 01:28

## 2020-09-01 NOTE — PLAN OF CARE
Vital signs stable. Postpartum assessment WDL. Pain controlled with tyl/ibu. Patient voiding without difficulty. Breastfeeding on cue without assistance. Patient and infant bonding well. Lactation teaching reinforced to protect mom's nipples from getting further blisters. Will continue with current plan of care.

## 2020-09-01 NOTE — PLAN OF CARE
VSS Pt using Ibuprofen and tylenol for pain control. Also using hot packs to abdomen for cramping. Breastfeeding infant on demand, latching well. Discharging to home with baby and  later today.    Pt discharged to home at 1340

## 2020-09-01 NOTE — PROGRESS NOTES
Postpartum Progress Note  Hemalatha Turner  [id number]    Subjective:   Doing well this morning. Has a lot more cramping than before - ibuprofen and tylenol is helping but she feels pretty sore. Has not had a bowel movement yet so thinks that almost may be contributing to discomfort. Lochia is normal. Eating and drinking well without nausea or vomiting. Ambulating without dizziness. Breastfeeding is going well, baby has a deep latch so she is having some nipple pain.     Objective:  /66   Pulse 81   Temp 97.6  F (36.4  C) (Oral)   Resp 16   LMP 2019 (Exact Date)   SpO2 96%   Breastfeeding Unknown     No intake/output data recorded.    General: NAD, sitting comfortably, alert  Heart: regular rate  Lungs: nonlabored breathing  Abdomen: soft, non distended, non tender to palpation. Fundus firm below U.   Extremities: warm and well perfused, no edema in LE, nontender    Hemoglobin   Date Value Ref Range Status   2020 13.0 11.7 - 15.7 g/dL Final     Assessment/Plan:  Hemalatha Turner is a 34 year old  who is PPD#1 s/p .  Currently stable and doing well postpartum.  - Routine post-partum cares  - Encouraged scheduled ibuprofen, tylenol and heat packs for cramping. Discussed tucks, ice and sitz baths for perineum.  - Heme: QBL 35mL, asymptomatic, normal Hgb. Resume PNV at discharge.  - Rh pos, Rubella immune  - Baby: breastfeeding  - Dispo: d/c to home today as she is meeting goals, orders done. Follow up at [facility] in 2 weeks.    Riddhi Zavala MD  OB/GYN & Infertility  20

## 2020-12-20 NOTE — ANESTHESIA PROCEDURE NOTES
Patient admitted for syncope on 12/18. Patient is AAO x 4. Up ad devon with assist x 1. Lungs clear/diminished, resp easy, and no cough noted. Patient has denied any light headedness or dizziness this evening. Remains on RA. Telemetry is on per order, and patient has been monitored at sinus rhythm. Procedure note : epidural catheter  Staff -   Anesthesiologist:  Maryann Parra MD      Performed By: anesthesiologist        Pre-Procedure  Performed by Maryann Parra MD  Location: OB      Pre-Anesthestic Checklist: patient identified, IV checked, risks and benefits discussed, informed consent, monitors and equipment checked, pre-op evaluation and at physician/surgeon's request    Timeout  Correct Patient: Yes   Correct Procedure: Yes   Correct Site: Yes   Correct Laterality: N/A   Correct Position: Yes   Site Marked: N/A   .   Procedure Documentation    Diagnosis:Labor Pain.    Procedure: epidural catheter, .   Patient Position:sitting Insertion Site:L3-4  (midline approach) Injection technique: LORT saline   Local skin infiltrated with mL of 1% lidocaine.  MICHELLE at 5 cm    Patient Prep/Sterile Barriers; mask, sterile gloves, povidone-iodine 7.5% surgical scrub, patient draped.  .  Needle: Touhy needle   Needle Gauge: 17.    Needle Length (Inches) 5   # of attempts: 1 and # of redirects:  .    Catheter: 19 G . .  Catheter threaded easily  3 cm epidural space.  8.5 cm at skin.   .    Assessment/Narrative  Paresthesias: No.  .  .  Aspiration negative for heme or CSF  . Test dose of 3 mL lidocaine 1.5% w/ 1:200,000 epinephrine at. Test dose negative for signs of intravascular, subdural or intrathecal injection. Comments:  Catheter secured with adhesive spray, tegaderm, and tape.

## 2021-01-09 ENCOUNTER — HEALTH MAINTENANCE LETTER (OUTPATIENT)
Age: 36
End: 2021-01-09

## 2021-05-08 ENCOUNTER — HEALTH MAINTENANCE LETTER (OUTPATIENT)
Age: 36
End: 2021-05-08

## 2021-10-23 ENCOUNTER — HEALTH MAINTENANCE LETTER (OUTPATIENT)
Age: 36
End: 2021-10-23

## 2022-06-04 ENCOUNTER — HEALTH MAINTENANCE LETTER (OUTPATIENT)
Age: 37
End: 2022-06-04

## 2022-10-09 ENCOUNTER — HEALTH MAINTENANCE LETTER (OUTPATIENT)
Age: 37
End: 2022-10-09

## 2023-06-10 ENCOUNTER — HEALTH MAINTENANCE LETTER (OUTPATIENT)
Age: 38
End: 2023-06-10